# Patient Record
Sex: FEMALE | Race: WHITE | Employment: FULL TIME | URBAN - METROPOLITAN AREA
[De-identification: names, ages, dates, MRNs, and addresses within clinical notes are randomized per-mention and may not be internally consistent; named-entity substitution may affect disease eponyms.]

---

## 2017-05-30 ENCOUNTER — OFFICE VISIT (OUTPATIENT)
Dept: FAMILY MEDICINE | Facility: CLINIC | Age: 22
End: 2017-05-30
Payer: COMMERCIAL

## 2017-05-30 VITALS
HEART RATE: 71 BPM | SYSTOLIC BLOOD PRESSURE: 111 MMHG | HEIGHT: 72 IN | OXYGEN SATURATION: 96 % | BODY MASS INDEX: 20.59 KG/M2 | DIASTOLIC BLOOD PRESSURE: 71 MMHG | TEMPERATURE: 98.6 F | WEIGHT: 152 LBS

## 2017-05-30 DIAGNOSIS — D50.8 OTHER IRON DEFICIENCY ANEMIA: ICD-10-CM

## 2017-05-30 DIAGNOSIS — J30.2 SEASONAL ALLERGIC RHINITIS, UNSPECIFIED ALLERGIC RHINITIS TRIGGER: ICD-10-CM

## 2017-05-30 DIAGNOSIS — J01.80 OTHER ACUTE SINUSITIS: ICD-10-CM

## 2017-05-30 DIAGNOSIS — J02.9 SORE THROAT: Primary | ICD-10-CM

## 2017-05-30 LAB
DEPRECATED S PYO AG THROAT QL EIA: NORMAL
ERYTHROCYTE [DISTWIDTH] IN BLOOD BY AUTOMATED COUNT: 14.7 % (ref 10–15)
HCT VFR BLD AUTO: 34.6 % (ref 35–47)
HGB BLD-MCNC: 11.3 G/DL (ref 11.7–15.7)
MCH RBC QN AUTO: 28.3 PG (ref 26.5–33)
MCHC RBC AUTO-ENTMCNC: 32.7 G/DL (ref 31.5–36.5)
MCV RBC AUTO: 87 FL (ref 78–100)
MICRO REPORT STATUS: NORMAL
PLATELET # BLD AUTO: 253 10E9/L (ref 150–450)
RBC # BLD AUTO: 4 10E12/L (ref 3.8–5.2)
SPECIMEN SOURCE: NORMAL
WBC # BLD AUTO: 10.9 10E9/L (ref 4–11)

## 2017-05-30 PROCEDURE — 87081 CULTURE SCREEN ONLY: CPT | Performed by: FAMILY MEDICINE

## 2017-05-30 PROCEDURE — 99203 OFFICE O/P NEW LOW 30 MIN: CPT | Performed by: FAMILY MEDICINE

## 2017-05-30 PROCEDURE — 87880 STREP A ASSAY W/OPTIC: CPT | Performed by: FAMILY MEDICINE

## 2017-05-30 PROCEDURE — 36415 COLL VENOUS BLD VENIPUNCTURE: CPT | Performed by: FAMILY MEDICINE

## 2017-05-30 PROCEDURE — 85027 COMPLETE CBC AUTOMATED: CPT | Performed by: FAMILY MEDICINE

## 2017-05-30 RX ORDER — AZITHROMYCIN 250 MG/1
TABLET, FILM COATED ORAL
Qty: 6 TABLET | Refills: 0 | Status: SHIPPED | OUTPATIENT
Start: 2017-05-30

## 2017-05-30 RX ORDER — FEXOFENADINE HCL 180 MG/1
180 TABLET ORAL DAILY
Qty: 30 TABLET | Refills: 1 | COMMUNITY
Start: 2017-05-30

## 2017-05-30 NOTE — PATIENT INSTRUCTIONS
Take medications as directed.  Cares and symptomatic cares discussed   Follow up if problem or concern

## 2017-05-30 NOTE — NURSING NOTE
Chief Complaint   Patient presents with     Pharyngitis     Headache       Initial /71  Pulse 71  Temp 98.6  F (37  C) (Tympanic)  Ht 6' (1.829 m)  Wt 152 lb (68.9 kg)  LMP 04/30/2017 (Approximate)  SpO2 96%  BMI 20.61 kg/m2 Estimated body mass index is 20.61 kg/(m^2) as calculated from the following:    Height as of this encounter: 6' (1.829 m).    Weight as of this encounter: 152 lb (68.9 kg).  Medication Reconciliation: complete

## 2017-05-30 NOTE — PROGRESS NOTES
SUBJECTIVE:                                                    Zahira Mckeon is a 21 year old female who presents to clinic today for the following health issues:      Acute Illness    Acute illness concerns: Sore Throat, Swollen Lymph nodes and Headache    Onset: 2 day     Fever: no     Chills/Sweats: YES- mild    Headache (location?): YES    Sinus Pressure:YES- post-nasal drainage    Conjunctivitis:  no    Ear Pain: no    Rhinorrhea: YES, yellow green mucus     Congestion: YES    Sore Throat: YES     Cough: no    Wheeze: no     Decreased Appetite: No    Nausea: YES    Vomiting: no     Diarrhea:  no     Dysuria/Freq.: no     Fatigue/Achiness: YES tiredness   Sick/Strep Exposure: no ,     Therapies Tried and outcome: Aleve   Add on problem   1. Has know allergies, although not taking anything for this     2. She has know hx of anemia, so wants her Hgb checked. She has been on iron  supplement previously,although not taking any more.   She has been a vegetarian so unsure if that could have caused the problem although she is eating pritesh chicken now. Her cycle is regular she is unusually 4-5 weeks.       Problem list and histories reviewed & adjusted, as indicated.  Additional history: as documented    There is no problem list on file for this patient.    No past surgical history on file.    Social History   Substance Use Topics     Smoking status: Never Smoker     Smokeless tobacco: Not on file     Alcohol use Not on file     No family history on file.        Reviewed and updated as needed this visit by clinical staff       Reviewed and updated as needed this visit by Provider         ROS:  Constitutional, HEENT, cardiovascular, pulmonary, GI, , musculoskeletal, neuro, skin, endocrine and psych systems are negative, except as otherwise noted.    OBJECTIVE:                                                    /71  Pulse 71  Temp 98.6  F (37  C) (Tympanic)  Ht 6' (1.829 m)  Wt 152 lb (68.9 kg)  LMP  04/30/2017 (Approximate)  SpO2 96%  BMI 20.61 kg/m2  Body mass index is 20.61 kg/(m^2).  GENERAL: healthy, alert and no distress  EYES: Eyes grossly normal to inspection, PERRL and conjunctivae and sclerae normal  HENT: ear canals and TM's normal and oral mucous membranes moist, Throat with mild pharyngeal erythema, +ve sinus  tenderness  NECK: no adenopathy, no asymmetry, masses, or scars and thyroid normal to palpation  RESP: lungs clear to auscultation - no rales, rhonchi or wheezes  CV: regular rate and rhythm, normal S1 S2, no S3 or S4, no murmur,      Diagnostic Test Results:  Strep screen - Negative     ASSESSMENT/PLAN:                                                        (J02.9) Sore throat  (primary encounter diagnosis)  Comment:   Plan: Strep, Rapid Screen, Beta strep group A culture            (J30.2) Seasonal allergic rhinitis, unspecified allergic rhinitis trigger  Comment:   Plan: fexofenadine (ALLEGRA) 180 MG tablet            (J01.80) Other acute sinusitis  Comment:   Plan: azithromycin (ZITHROMAX) 250 MG tablet            (D50.8) Other iron deficiency anemia  Comment:   Plan: CBC with platelets              Check labs. Cares and symptomatic treatment discussed follow up if problem   Patient expressed understanding and agreement with treatment plan. All patient's questions were answered, will let me know if has more later.  Medications: Rx's: Reviewed the potential side effects/complications of medications prescribed.       Tila Cervantes MD  [facility] [place]

## 2017-05-30 NOTE — LETTER
[address] [place] [address]   [phone]      June 5, 2017    Zahira Mckeon  [address] [place] [address]            Dear Ms. Mckeon    Negative  strep culture   Cbc test shows low Hemoglobin suggesting  Anemia. You should take iron supplement and recheck in 4 weeks. Consider further evaluation if no improvement.    Results for orders placed or performed in visit on 05/30/17   CBC with platelets   Result Value Ref Range    WBC 10.9 4.0 - 11.0 10e9/L    RBC Count 4.00 3.8 - 5.2 10e12/L    Hemoglobin 11.3 (L) 11.7 - 15.7 g/dL    Hematocrit 34.6 (L) 35.0 - 47.0 %    MCV 87 78 - 100 fl    MCH 28.3 26.5 - 33.0 pg    MCHC 32.7 31.5 - 36.5 g/dL    RDW 14.7 10.0 - 15.0 %    Platelet Count 253 150 - 450 10e9/L   Strep, Rapid Screen   Result Value Ref Range    Specimen Description Throat     Rapid Strep A Screen       NEGATIVE: No Group A streptococcal antigen detected by immunoassay, await   culture report.      Micro Report Status FINAL 05/30/2017    Beta strep group A culture   Result Value Ref Range    Specimen Description Throat     Culture Micro No Beta Streptococcus isolated     Micro Report Status FINAL 05/31/2017          Sincerely,   Tila Cervantes M.D.

## 2017-05-30 NOTE — MR AVS SNAPSHOT
"              After Visit Summary   5/30/2017    Zahira Mckeon    MRN: [id number]           Patient Information     Date Of Birth          1995        Visit Information        Provider Department      5/30/2017 4:30 PM Tila Cervantes MD [facility]        Today's Diagnoses     Sore throat    -  1    Seasonal allergic rhinitis, unspecified allergic rhinitis trigger        Other acute sinusitis        Other iron deficiency anemia          Care Instructions    Take medications as directed.  Cares and symptomatic cares discussed   Follow up if problem or concern             Follow-ups after your visit        Who to contact     If you have questions or need follow up information about today's clinic visit or your schedule please contact [facility] directly at [phone].  Normal or non-critical lab and imaging results will be communicated to you by MyChart, letter or phone within 4 business days after the clinic has received the results. If you do not hear from us within 7 days, please contact the clinic through MyChart or phone. If you have a critical or abnormal lab result, we will notify you by phone as soon as possible.  Submit refill requests through [facility] [employer] or call your pharmacy and they will forward the refill request to us. Please allow 3 business days for your refill to be completed.          Additional Information About Your Visit        [facility] Information     [employer] lets you send messages to your doctor, view your test results, renew your prescriptions, schedule appointments and more. To sign up, go to [email or web address] . Click on \"Log in\" on the left side of the screen, which will take you to the Welcome page. Then click on \"Sign up Now\" on the right side of the page.     You will be asked to enter the access code listed below, as well as some personal information. Please follow the directions to create your username and password.     Your access " code is: [email or web address]  Expires: 2017  5:09 PM     Your access code will  in 90 days. If you need help or a new code, please call your [facility] clinic or [phone].        Care EveryWhere ID     This is your Care EveryWhere ID. This could be used by other organizations to access your [facility] medical records  [id number]        Your Vitals Were     Pulse Temperature Height Last Period Pulse Oximetry BMI (Body Mass Index)    71 98.6  F (37  C) (Tympanic) 6' (1.829 m) 2017 (Approximate) 96% 20.61 kg/m2       Blood Pressure from Last 3 Encounters:   17 111/71   11 98/64    Weight from Last 3 Encounters:   17 152 lb (68.9 kg)   11 145 lb (65.8 kg) (80 %)*     * Growth percentiles are based on [facility] 2-20 Years data.              We Performed the Following     Beta strep group A culture     CBC with platelets     Strep, Rapid Screen          Today's Medication Changes          These changes are accurate as of: 17  5:09 PM.  If you have any questions, ask your nurse or doctor.               Start taking these medicines.        Dose/Directions    azithromycin 250 MG tablet   Commonly known as:  ZITHROMAX   Used for:  Other acute sinusitis   Started by:  Tila Cervantes MD        Two tablets first day, then one tablet daily for four days.   Quantity:  6 tablet   Refills:  0       fexofenadine 180 MG tablet   Commonly known as:  ALLEGRA   Used for:  Seasonal allergic rhinitis, unspecified allergic rhinitis trigger   Started by:  Tila Cervantes MD        Dose:  180 mg   Take 1 tablet (180 mg) by mouth daily   Quantity:  30 tablet   Refills:  1            Where to get your medicines      These medications were sent to [facility] [place] - [id number] [facility] [place] [address]     Phone:  [phone]     azithromycin 250 MG tablet         Some of these will need a paper prescription and others can be  bought over the counter.  Ask your nurse if you have questions.     You don't need a prescription for these medications     fexofenadine 180 MG tablet                Primary Care Provider    None Specified       No primary provider on file.        Thank you!     Thank you for choosing [facility]  for your care. Our goal is always to provide you with excellent care. Hearing back from our patients is one way we can continue to improve our services. Please take a few minutes to complete the written survey that you may receive in the mail after your visit with us. Thank you!             Your Updated Medication List - Protect others around you: Learn how to safely use, store and throw away your medicines at www.disposemymeds.org.          This list is accurate as of: 5/30/17  5:09 PM.  Always use your most recent med list.                   Brand Name Dispense Instructions for use    ADVIL PO      Take  by mouth. PRN       azithromycin 250 MG tablet    ZITHROMAX    6 tablet    Two tablets first day, then one tablet daily for four days.       fexofenadine 180 MG tablet    ALLEGRA    30 tablet    Take 1 tablet (180 mg) by mouth daily

## 2017-05-31 LAB
BACTERIA SPEC CULT: NORMAL
MICRO REPORT STATUS: NORMAL
SPECIMEN SOURCE: NORMAL

## 2017-11-12 ENCOUNTER — HEALTH MAINTENANCE LETTER (OUTPATIENT)
Age: 22
End: 2017-11-12

## 2019-03-23 ENCOUNTER — OFFICE VISIT (OUTPATIENT)
Dept: URGENT CARE | Facility: URGENT CARE | Age: 24
End: 2019-03-23
Payer: COMMERCIAL

## 2019-03-23 VITALS
WEIGHT: 150 LBS | OXYGEN SATURATION: 97 % | RESPIRATION RATE: 14 BRPM | HEART RATE: 88 BPM | SYSTOLIC BLOOD PRESSURE: 108 MMHG | BODY MASS INDEX: 20.32 KG/M2 | TEMPERATURE: 99.5 F | HEIGHT: 72 IN | DIASTOLIC BLOOD PRESSURE: 68 MMHG

## 2019-03-23 DIAGNOSIS — J03.90 TONSILLITIS: Primary | ICD-10-CM

## 2019-03-23 PROCEDURE — 99213 OFFICE O/P EST LOW 20 MIN: CPT | Performed by: FAMILY MEDICINE

## 2019-03-23 RX ORDER — METHYLPREDNISOLONE 4 MG
TABLET, DOSE PACK ORAL
Qty: 21 TABLET | Refills: 0 | Status: SHIPPED | OUTPATIENT
Start: 2019-03-23

## 2019-03-23 ASSESSMENT — MIFFLIN-ST. JEOR: SCORE: 1537.4

## 2019-03-23 NOTE — PROGRESS NOTES
SUBJECTIVE:   Zahira Mckeon is a 23 year old female presenting with   Chief Complaint   Patient presents with     Urgent Care     Pharyngitis     sick since Thursday afternoon. Sore throat and fever, chlls. Was seen yesterday, checked for strep (neg). Diagnosed with tonsillitis. Has had to take steroids in the past.      Symptoms started 3/21 and include: feverish/chills (no temps measured at home) and sore throat.  feels like the right side of her throat is more swollen inside than the left.  No cough/nasasl symptoms.  Was seen on 3/22 and had a negative strep, diagnosed with tonsillitis and started on zithromax (PCN allergic).  Has had tonsillitis several times in the past, generally put on steroids as well and wondering if she can start steroids today.   No change in symptoms today.      ROS:  5-Point Review of Systems Negative-- Except as stated above.    OBJECTIVE  /68   Pulse 88   Temp 99.5  F (37.5  C) (Oral)   Resp 14   Ht 1.829 m (6')   Wt 68 kg (150 lb)   LMP  (Exact Date)   SpO2 97%   BMI 20.34 kg/m    GENERAL:  Awake, alert and interactive. No acute distress.  HEENT:   NC/AT, EOMI, clear conjunctiva.  Nose clear.  Oropharynx with diffuse erythema.  Left tonsil erythematous and 2+ with exudate. Right tonsil erythematous and 3+ with exudate.   TM's and EAC's benign.  NECK: supple and free of adenopathy, no tenderness to palpation  CHEST:  Lungs are clear, no rhonchi, wheezing or rales. Normal symmetric air entry throughout both lung fields.   HEART:  S1 and S2 normal, no murmurs. Regular rate and rhythm.      ASSESSMENT/PLAN    ICD-10-CM    1. Tonsillitis J03.90 methylPREDNISolone (MEDROL DOSEPAK) 4 MG tablet therapy pack       Advised to buy an oral thermometer today, to monitor temps.  Right tonsil slightly larger than left today.  reviewed if any worsening at all or fevers not resolved by tomorrow, to ER for further evaluation this weekend.  We discussed the expected course of the  illness and symptomatic cares in detail.      Patient Instructions   Start the steroid taper today.   Continue with your antibiotic.  Return to care (go to the emergency room this weekend) if any fevers (a temperature of 100.5 or above) develop after you've been on the antibiotic more than 48 hours.   Return to care if any worsening symptoms develop despite the antibiotic.

## 2019-03-23 NOTE — PATIENT INSTRUCTIONS
Start the steroid taper today.   Continue with your antibiotic.  Return to care (go to the emergency room this weekend) if any fevers (a temperature of 100.5 or above) develop after you've been on the antibiotic more than 48 hours.   Return to care if any worsening symptoms develop despite the antibiotic.

## 2020-03-02 ENCOUNTER — HEALTH MAINTENANCE LETTER (OUTPATIENT)
Age: 25
End: 2020-03-02

## 2020-12-14 ENCOUNTER — HEALTH MAINTENANCE LETTER (OUTPATIENT)
Age: 25
End: 2020-12-14

## 2021-04-18 ENCOUNTER — HEALTH MAINTENANCE LETTER (OUTPATIENT)
Age: 26
End: 2021-04-18

## 2021-10-02 ENCOUNTER — HEALTH MAINTENANCE LETTER (OUTPATIENT)
Age: 26
End: 2021-10-02

## 2022-05-14 ENCOUNTER — HEALTH MAINTENANCE LETTER (OUTPATIENT)
Age: 27
End: 2022-05-14

## 2022-09-03 ENCOUNTER — HEALTH MAINTENANCE LETTER (OUTPATIENT)
Age: 27
End: 2022-09-03

## 2023-06-03 ENCOUNTER — HEALTH MAINTENANCE LETTER (OUTPATIENT)
Age: 28
End: 2023-06-03